# Patient Record
Sex: MALE | Race: WHITE | NOT HISPANIC OR LATINO | Employment: STUDENT | URBAN - METROPOLITAN AREA
[De-identification: names, ages, dates, MRNs, and addresses within clinical notes are randomized per-mention and may not be internally consistent; named-entity substitution may affect disease eponyms.]

---

## 2021-09-24 ENCOUNTER — APPOINTMENT (OUTPATIENT)
Dept: GENERAL RADIOLOGY | Facility: CLINIC | Age: 21
End: 2021-09-24
Attending: EMERGENCY MEDICINE
Payer: COMMERCIAL

## 2021-09-24 ENCOUNTER — HOSPITAL ENCOUNTER (EMERGENCY)
Facility: CLINIC | Age: 21
Discharge: HOME | End: 2021-09-25
Attending: EMERGENCY MEDICINE | Admitting: EMERGENCY MEDICINE
Payer: COMMERCIAL

## 2021-09-24 DIAGNOSIS — M25.531 RIGHT WRIST PAIN: Primary | ICD-10-CM

## 2021-09-24 DIAGNOSIS — M79.631 PAIN OF RIGHT FOREARM: ICD-10-CM

## 2021-09-24 PROCEDURE — 250N000013 HC RX MED GY IP 250 OP 250 PS 637: Performed by: EMERGENCY MEDICINE

## 2021-09-24 PROCEDURE — 73090 X-RAY EXAM OF FOREARM: CPT | Mod: 26 | Performed by: RADIOLOGY

## 2021-09-24 PROCEDURE — 99283 EMERGENCY DEPT VISIT LOW MDM: CPT | Performed by: EMERGENCY MEDICINE

## 2021-09-24 PROCEDURE — 73130 X-RAY EXAM OF HAND: CPT | Mod: RT

## 2021-09-24 PROCEDURE — 73130 X-RAY EXAM OF HAND: CPT | Mod: 26 | Performed by: RADIOLOGY

## 2021-09-24 PROCEDURE — 73090 X-RAY EXAM OF FOREARM: CPT | Mod: RT

## 2021-09-24 RX ORDER — ACETAMINOPHEN 500 MG
1000 TABLET ORAL ONCE
Status: COMPLETED | OUTPATIENT
Start: 2021-09-24 | End: 2021-09-24

## 2021-09-24 RX ORDER — DEXTROAMPHETAMINE SACCHARATE, AMPHETAMINE ASPARTATE MONOHYDRATE, DEXTROAMPHETAMINE SULFATE AND AMPHETAMINE SULFATE 5; 5; 5; 5 MG/1; MG/1; MG/1; MG/1
20 CAPSULE, EXTENDED RELEASE ORAL EVERY MORNING
COMMUNITY

## 2021-09-24 RX ORDER — LORATADINE 10 MG/1
10 TABLET ORAL DAILY
COMMUNITY

## 2021-09-24 RX ORDER — OXYCODONE HYDROCHLORIDE 5 MG/1
5 TABLET ORAL ONCE
Status: COMPLETED | OUTPATIENT
Start: 2021-09-24 | End: 2021-09-24

## 2021-09-24 RX ADMIN — OXYCODONE HYDROCHLORIDE 5 MG: 5 TABLET ORAL at 23:33

## 2021-09-24 RX ADMIN — ACETAMINOPHEN 1000 MG: 500 TABLET, FILM COATED ORAL at 23:33

## 2021-09-24 ASSESSMENT — ENCOUNTER SYMPTOMS
FLANK PAIN: 0
NUMBNESS: 0
COUGH: 0
SHORTNESS OF BREATH: 0
BRUISES/BLEEDS EASILY: 0
ABDOMINAL PAIN: 0
PALPITATIONS: 0
NECK PAIN: 0
CHILLS: 0
WOUND: 0
VOMITING: 0
WEAKNESS: 0
BACK PAIN: 0
HEMATURIA: 0
CONFUSION: 0
HEADACHES: 0
NAUSEA: 0
LIGHT-HEADEDNESS: 0
COLOR CHANGE: 0
FEVER: 0

## 2021-09-25 VITALS
DIASTOLIC BLOOD PRESSURE: 76 MMHG | RESPIRATION RATE: 16 BRPM | TEMPERATURE: 98.9 F | OXYGEN SATURATION: 96 % | SYSTOLIC BLOOD PRESSURE: 135 MMHG | HEART RATE: 82 BPM | WEIGHT: 200 LBS

## 2021-09-25 NOTE — ED PROVIDER NOTES
ED Provider Note  [facility]      History     Chief Complaint   Patient presents with     Fall     Wrist Pain     HPI  Gurmeet Crystal is a 21 year old male who presented for evaluation of right hand/wrist/forearm pain.  Patient reports that he was rock climbing, fell approximately 8 feet onto his right upper extremity.  He reports he is experiencing sharp pain localized to the ulnar aspect of the right hand and wrist, radiating up the arm.  Denies any becki pain in the right elbow, upper arm, or shoulder.  Denies any other focal pain in the remainder of his extremities.  Denies hitting his head, denies loss of consciousness.  Denies any numbness, tingling, or weakness. The patient denies any other physical concerns today.     Past Medical History  History reviewed. No pertinent past medical history.  History reviewed. No pertinent surgical history.  amphetamine-dextroamphetamine (ADDERALL XR) 20 MG 24 hr capsule  loratadine (CLARITIN) 10 MG tablet  ORDER FOR DME      No Known Allergies  Family History  History reviewed. No pertinent family history.  Social History   Social History     Tobacco Use     Smoking status: Never Smoker     Smokeless tobacco: Never Used   Substance Use Topics     Alcohol use: None     Drug use: None      Past medical history, past surgical history, medications, allergies, family history, and social history were reviewed with the patient. No additional pertinent items.       Review of Systems   Constitutional: Negative for chills and fever.   HENT: Negative for ear pain and nosebleeds.    Eyes: Negative for visual disturbance.   Respiratory: Negative for cough and shortness of breath.    Cardiovascular: Negative for chest pain and palpitations.   Gastrointestinal: Negative for abdominal pain, nausea and vomiting.   Genitourinary: Negative for flank pain and hematuria.   Musculoskeletal: Negative for back pain and neck pain.        Positive for right hand,  right wrist, and right forearm pain.   Skin: Negative for color change, rash and wound.   Allergic/Immunologic: Negative for immunocompromised state.   Neurological: Negative for syncope, weakness, light-headedness, numbness and headaches.   Hematological: Does not bruise/bleed easily.   Psychiatric/Behavioral: Negative for confusion.   All other systems reviewed and are negative.    A complete review of systems was performed with pertinent positives and negatives noted in the HPI, and all other systems negative.    Physical Exam   BP: 135/76  Pulse: 82  Temp: 98.9  F (37.2  C)  Resp: 18  Weight: 90.7 kg (200 lb)  SpO2: 96 %     Physical Exam  Vitals and nursing note reviewed.   Constitutional:       General: He is not in acute distress.     Appearance: He is not ill-appearing, toxic-appearing or diaphoretic.   HENT:      Head: Normocephalic and atraumatic.      Right Ear: External ear normal.      Left Ear: External ear normal.      Nose: Nose normal.      Mouth/Throat:      Mouth: Mucous membranes are moist.   Eyes:      Extraocular Movements: Extraocular movements intact.      Conjunctiva/sclera: Conjunctivae normal.      Pupils: Pupils are equal, round, and reactive to light.   Cardiovascular:      Rate and Rhythm: Normal rate and regular rhythm.      Pulses: Normal pulses.      Heart sounds: Normal heart sounds. No murmur heard.   No friction rub. No gallop.       Comments: 2+ radial pulse right hand arm.  Pulmonary:      Effort: Pulmonary effort is normal. No respiratory distress.      Breath sounds: Normal breath sounds. No stridor. No wheezing, rhonchi or rales.   Chest:      Chest wall: No tenderness.   Abdominal:      General: Bowel sounds are normal. There is no distension.      Tenderness: There is no abdominal tenderness. There is no right CVA tenderness, left CVA tenderness, guarding or rebound.   Musculoskeletal:      Cervical back: Normal range of motion and neck supple. No rigidity or tenderness.       Right lower leg: No edema.      Left lower leg: No edema.      Comments: Tenderness to palpation at the ulnar aspect of the hand, as well as the ulnar aspect of the wrist and forearm without obvious deformity. No pain with ROM of the right elbow or shoulder. No snuffbox tenderness. No pain with ROM of the remainder of the joints in all 4 extremities.    Skin:     General: Skin is warm.      Capillary Refill: Capillary refill takes less than 2 seconds.   Neurological:      General: No focal deficit present.      Mental Status: He is alert.      Comments: Able to give me a thumbs up, cross digits 2 & 3, and make an okay sign and then resist me breaking the [place] that his thumb and index finger make with the right hand.   Psychiatric:         Mood and Affect: Mood normal.         Behavior: Behavior normal.         ED Course     ED Course as of Sep 24 2330   Fri Sep 24, 2021   2317 Right radius and ulna within normal limits. No fracture.   Radius/Ulna XR, PA & LAT, right   2317 Normal right hand joint spaces and alignment. No fracture.   XR Hand Right G/E 3 Views     Procedures: none.        The medical record was reviewed and interpreted.  Current images reviewed and interpreted: as above.    Medications   acetaminophen (TYLENOL) tablet 1,000 mg (has no administration in time range)   oxyCODONE (ROXICODONE) tablet 5 mg (has no administration in time range)        Assessments & Plan (with Medical Decision Making)   Nursing notes and vital signs reviewed.     Presentation, exam, and workup is most consistent with right hand/wrist/forearm pain, without acute fracture.    Please see HPI, ROS, exam, and ED course above for pertinent history and findings. In short, the patient presented to the ED for evaluation of right hand, wrist, and forearm pain after fall approximately 8 feet while rock wall climbing.  He denies any other injuries.    Exam with somewhat diffuse tenderness along the ulnar aspect of the hand, wrist,  and forearm without obvious deformity.  No ecchymosis or swelling.  Compartments are soft throughout the forearm.  X-ray without acute fracture.  Placed in ACE wrap and sling for comfort.  Discussed alternating Tylenol and ibuprofen and following up with his primary care physician.    I discussed with the patient the results of the above studies. All questions were answered prior to discharge, and the patient was told to follow up with his primary care doctor per discharge instructions. Reasons for return as well as follow up were reviewed with the patient.  The patient expressed understanding and agreement.    Disposition: The patient was discharged to home in stable condition.      Final diagnoses:   Right wrist pain   Pain of right forearm       --  Sadia Raines MD   [facility] EMERGENCY DEPARTMENT  9/24/2021     Sadia Raines MD  09/24/21 [id number]

## 2021-09-25 NOTE — ED TRIAGE NOTES
Pt presents ambulatory to triage post fall from rock climbing wall. Pt stated he fell about 8 ft onto R wrist. Denies hitting head or LOC. Pt now having R wrist pain that radiates up R forearm. CMS intact.   Shelly Monge RN on 9/24/2021 at 10:08 PM

## 2021-09-25 NOTE — DISCHARGE INSTRUCTIONS
You were evaluated for pain to the right hand/wrist/forearm and x-ray did not show any broken bones. However, there is always a small chance that there is a hairline (small) fracture that cannot be seen on x-rays today - however, if you develop increased pain, numbness, weakness, or any other symptoms that are concerning to you you need to return to the ER for re-evaluation and repeat x-ray.      For your pain you can alternate between Tylenol and ibuprofen, taking each every 6 hours staggered between the two.  For example, you can take 500 to 1000 mg of Tylenol, 3 hours later take 400 mg ibuprofen, 3 hours later repeat the Tylenol.  Do not take more than 4 g of Tylenol in a 24-hour period, as this can damage your liver.  Do not take more than four doses of 400 mg ibuprofen in a day, as further doses could damage your kidneys.

## 2023-01-08 ENCOUNTER — OFFICE VISIT (OUTPATIENT)
Dept: URGENT CARE | Facility: URGENT CARE | Age: 23
End: 2023-01-08
Payer: COMMERCIAL

## 2023-01-08 VITALS
HEART RATE: 65 BPM | RESPIRATION RATE: 12 BRPM | OXYGEN SATURATION: 99 % | SYSTOLIC BLOOD PRESSURE: 122 MMHG | TEMPERATURE: 97.4 F | DIASTOLIC BLOOD PRESSURE: 76 MMHG | WEIGHT: 188.4 LBS

## 2023-01-08 DIAGNOSIS — R07.0 THROAT PAIN: Primary | ICD-10-CM

## 2023-01-08 LAB — DEPRECATED S PYO AG THROAT QL EIA: NEGATIVE

## 2023-01-08 PROCEDURE — 99203 OFFICE O/P NEW LOW 30 MIN: CPT

## 2023-01-08 PROCEDURE — 87651 STREP A DNA AMP PROBE: CPT

## 2023-01-09 LAB — GROUP A STREP BY PCR: NOT DETECTED

## 2023-01-09 NOTE — PROGRESS NOTES
ASSESSMENT:   (R07.0) Throat pain  (primary encounter diagnosis)  Plan: Streptococcus A Rapid Screen w/Reflex to PCR -         Clinic Collect, Group A Streptococcus PCR         Throat Swab    PLAN:  Informed the patient that the strep test is negative.  We discussed the need to get plenty of rest, drink fluids and use Tylenol and or ibuprofen as needed for pain and fever with a maximum dose of Tylenol being 4000 mg in a 24-hour period of time and to take ibuprofen with food to avoid upset stomach.  We also discussed trying warm salt water gargles, hot/warm water or tea with honey and/or lemon and/or Cepacol lozenges or spray for the sore throat.  Discussed the need to return to clinic with any new or worsening symptoms.  Patient acknowledged their understanding of the above plan.    STANISLAW Pedro CNP      SUBJECTIVE:   Gurmeet Crystal  is a 22 year old male who is here today because of: Sore Throat.  The patient has had symptoms of swollen glands.   Onset of symptoms was 1.5 weeks ago. Course of illness is worsening.  Patient denies exposure to illness at home or work/school.   Patient denies fever, cough, nasal congestion/runny nose, nausea, vomiting and diarrhea  Treatment measures tried include acetaminophen, ibuprofen and NyQuil.    At home COVID test negative.     ROS:  Review of systems negative except as stated above.    OBJECTIVE:   /76   Pulse 65   Temp 97.4  F (36.3  C) (Tympanic)   Resp 12   Wt 85.5 kg (188 lb 6.4 oz)   SpO2 99%   General: healthy, alert and no distress  Eyes - conjunctivae clear.  Ears - External ears normal. Canals clear. TM's normal.  Nose/Sinuses - Nares normal.Mucosa normal. No drainage or sinus tenderness.  Oropharynx - Lips, mucosa, tonsils and tongue normal. Positive findings: oropharyngeal erythema  Neck - Neck supple; Positive findings: moderate bilateral anterior cervical lymphadenopathy   Lungs - Lungs clear; no wheezing or rales.  Heart - regular  rate and rhythm. No murmurs, rub.    Labs:  Rapid Strep test is negative; await throat culture results.

## 2023-01-09 NOTE — PATIENT INSTRUCTIONS
Strep test is negative.  Get plenty of rest and drink fluids.  Can use Tylenol and/or ibuprofen as needed for pain and fever.  Maximum dose of Tylenol is 4000mg in a 24 hour period of time.  Take ibuprofen with food to avoid stomach upset.  You can also try warm salt water gargles, hot/warm water or tea with honey and/or lemon and/or Cepacol lozenges or spray for your sore throat.